# Patient Record
Sex: MALE | Race: BLACK OR AFRICAN AMERICAN
[De-identification: names, ages, dates, MRNs, and addresses within clinical notes are randomized per-mention and may not be internally consistent; named-entity substitution may affect disease eponyms.]

---

## 2018-01-21 ENCOUNTER — HOSPITAL ENCOUNTER (EMERGENCY)
Dept: HOSPITAL 17 - NEPD | Age: 23
Discharge: HOME | End: 2018-01-21
Payer: SELF-PAY

## 2018-01-21 DIAGNOSIS — J06.9: Primary | ICD-10-CM

## 2018-01-21 PROCEDURE — 99282 EMERGENCY DEPT VISIT SF MDM: CPT

## 2018-02-04 ENCOUNTER — HOSPITAL ENCOUNTER (EMERGENCY)
Dept: HOSPITAL 17 - NEPD | Age: 23
Discharge: LEFT BEFORE DISCHARGE | End: 2018-02-04
Payer: SELF-PAY

## 2018-02-04 VITALS
OXYGEN SATURATION: 99 % | DIASTOLIC BLOOD PRESSURE: 55 MMHG | RESPIRATION RATE: 18 BRPM | SYSTOLIC BLOOD PRESSURE: 124 MMHG | TEMPERATURE: 97.9 F | HEART RATE: 55 BPM

## 2018-02-04 VITALS — HEIGHT: 73 IN | BODY MASS INDEX: 33.6 KG/M2 | WEIGHT: 253.53 LBS

## 2018-02-04 VITALS — OXYGEN SATURATION: 98 % | RESPIRATION RATE: 18 BRPM

## 2018-02-04 DIAGNOSIS — K52.9: Primary | ICD-10-CM

## 2018-02-04 LAB
ALBUMIN SERPL-MCNC: 4 GM/DL (ref 3.4–5)
ALP SERPL-CCNC: 109 U/L (ref 45–117)
ALT SERPL-CCNC: 60 U/L (ref 12–78)
AST SERPL-CCNC: 37 U/L (ref 15–37)
BASOPHILS # BLD AUTO: 0.1 TH/MM3 (ref 0–0.2)
BASOPHILS NFR BLD: 0.7 % (ref 0–2)
BILIRUB SERPL-MCNC: 0.3 MG/DL (ref 0.2–1)
BUN SERPL-MCNC: 14 MG/DL (ref 7–18)
CALCIUM SERPL-MCNC: 9.2 MG/DL (ref 8.5–10.1)
CHLORIDE SERPL-SCNC: 106 MEQ/L (ref 98–107)
CREAT SERPL-MCNC: 1.22 MG/DL (ref 0.6–1.3)
EOSINOPHIL # BLD: 0.1 TH/MM3 (ref 0–0.4)
EOSINOPHIL NFR BLD: 0.9 % (ref 0–4)
ERYTHROCYTE [DISTWIDTH] IN BLOOD BY AUTOMATED COUNT: 14.5 % (ref 11.6–17.2)
GFR SERPLBLD BASED ON 1.73 SQ M-ARVRAT: 91 ML/MIN (ref 89–?)
GLUCOSE SERPL-MCNC: 93 MG/DL (ref 74–106)
HCO3 BLD-SCNC: 30.2 MEQ/L (ref 21–32)
HCT VFR BLD CALC: 43.9 % (ref 39–51)
HGB BLD-MCNC: 14.7 GM/DL (ref 13–17)
LYMPHOCYTES # BLD AUTO: 0.2 TH/MM3 (ref 1–4.8)
LYMPHOCYTES NFR BLD AUTO: 3.1 % (ref 9–44)
MCH RBC QN AUTO: 26 PG (ref 27–34)
MCHC RBC AUTO-ENTMCNC: 33.6 % (ref 32–36)
MCV RBC AUTO: 77.3 FL (ref 80–100)
MONOCYTE #: 0.4 TH/MM3 (ref 0–0.9)
MONOCYTES NFR BLD: 5.5 % (ref 0–8)
NEUTROPHILS # BLD AUTO: 6.5 TH/MM3 (ref 1.8–7.7)
NEUTROPHILS NFR BLD AUTO: 89.8 % (ref 16–70)
PLATELET # BLD: 147 TH/MM3 (ref 150–450)
PMV BLD AUTO: 9.2 FL (ref 7–11)
PROT SERPL-MCNC: 7.6 GM/DL (ref 6.4–8.2)
RBC # BLD AUTO: 5.68 MIL/MM3 (ref 4.5–5.9)
SODIUM SERPL-SCNC: 139 MEQ/L (ref 136–145)
WBC # BLD AUTO: 7.3 TH/MM3 (ref 4–11)

## 2018-02-04 PROCEDURE — 85025 COMPLETE CBC W/AUTO DIFF WBC: CPT

## 2018-02-04 PROCEDURE — 96375 TX/PRO/DX INJ NEW DRUG ADDON: CPT

## 2018-02-04 PROCEDURE — 96374 THER/PROPH/DIAG INJ IV PUSH: CPT

## 2018-02-04 PROCEDURE — 96372 THER/PROPH/DIAG INJ SC/IM: CPT

## 2018-02-04 PROCEDURE — 99284 EMERGENCY DEPT VISIT MOD MDM: CPT

## 2018-02-04 PROCEDURE — 80053 COMPREHEN METABOLIC PANEL: CPT

## 2018-02-04 PROCEDURE — 87804 INFLUENZA ASSAY W/OPTIC: CPT

## 2018-02-04 NOTE — PD
HPI


Chief Complaint:  GI Complaint


Time Seen by Provider:  10:48


Travel History


International Travel<30 days:  No


Contact w/Intl Traveler<30days:  No


Traveled to known affect area:  No





History of Present Illness


HPI


22-year-old -American male presents emergency department with history of 

sudden onset nausea and vomiting around midnight last night.  Patient denies 

significant abdominal pain, or diarrhea.  He has had the chills, and low-grade 

fevers.  Patient denies upper respiratory symptoms such as headache, sore throat

, or congestion.  He has no chest pain or congestion there.  Last time he threw 

up was approximately 30 minutes prior to arriving here.  He is still urinating 

and has no urinary symptoms.  He has no known drug allergies





PFSH


Past Medical History


Autoimmune Disease:  No


Gastrointestinal Disorders:  No


Genitourinary:  No


Neurologic:  No


Respiratory:  No


Immunizations Current:  Yes





Past Surgical History


Other Surgery:  No





Social History


Alcohol Use:  No


Tobacco Use:  No


Substance Use:  No





Allergies-Medications


(Allergen,Severity, Reaction):  


Coded Allergies:  


     No Known Allergies (Verified  Adverse Reaction, Unknown, 1/21/18)


Reported Meds & Prescriptions





Reported Meds & Active Scripts


Active


Bentyl (Dicyclomine HCl) 10 Mg Cap 10 Mg PO QID


Zofran (Ondansetron HCl) 4 Mg Tab 4 Mg PO Q6HR PRN








Review of Systems


Except as stated in HPI:  all other systems reviewed are Neg


General / Constitutional:  Positive: Fever, Chills


Eyes:  No: Visual changes


HENT:  No: Headaches


Cardiovascular:  No: Chest Pain or Discomfort


Respiratory:  No: Shortness of Breath


Gastrointestinal:  Positive: Nausea, Vomiting, Loss of Appetite, No: Diarrhea, 

Abdominal Pain, Indigestion, Dysphagia


Genitourinary:  No: Dysuria


Musculoskeletal:  No: Pain


Skin:  No Rash


Neurologic:  No: Weakness


Psychiatric:  No: Depression


Endocrine:  No: Polydipsia


Hematologic/Lymphatic:  No: Easy Bruising





Physical Exam


Narrative


GENERAL: Patient appears mildly ill but not septic.


SKIN: Warm and dry.  Normal color.  Normal turgor


HEAD: Atraumatic. Normocephalic. 


EYES: Pupils equal and round. No scleral icterus. No injection or drainage. 


ENT: No nasal bleeding or discharge.  Mucous membranes pink and moist.  TMs are 

clear bilaterally.  No sinus tenderness.  No postnasal drip.  Pharynx is clear.

  Airways patent.


NECK: Trachea midline.  Supple and nontender


CARDIOVASCULAR: Regular rate and rhythm.  


RESPIRATORY: No accessory muscle use. Clear to auscultation. Breath sounds 

equal bilaterally. 


GASTROINTESTINAL: Abdomen soft, mildly diffusely tender, nondistended.  No 

point tenderness or rebound.  Hepatic and splenic margins not palpable.  No CVA 

tenderness.


MUSCULOSKELETAL: Extremities without clubbing, cyanosis, or edema. No obvious 

deformities. 


NEUROLOGICAL: Awake and alert. No obvious cranial nerve deficits.  Motor 

grossly within normal limits. Five out of 5 muscle strength in the arms and 

legs.  Normal speech.


PSYCHIATRIC: Appropriate mood and affect; insight and judgment normal.





Data


Data


Last Documented VS





Vital Signs








  Date Time  Temp Pulse Resp B/P (MAP) Pulse Ox O2 Delivery O2 Flow Rate FiO2


 


2/4/18 11:32   18     


 


2/4/18 11:31     98 Room Air  


 


2/4/18 10:33 97.9 55      








Orders





 Orders


Complete Blood Count With Diff (2/4/18 10:59)


Comprehensive Metabolic Panel (2/4/18 10:59)


Iv Access Insert/Monitor (2/4/18 10:59)


Ecg Monitoring (2/4/18 10:59)


Oximetry (2/4/18 10:59)


NPO (2/4/18 10:59)


Ondansetron Inj (Zofran Inj) (2/4/18 11:00)


Sodium Chlor 0.9% 1000 Ml Inj (Ns 1000 M (2/4/18 10:59)


Sodium Chloride 0.9% Flush (Ns Flush) (2/4/18 11:00)


Influenzae A/B Antigen (2/4/18 11:45)


Sodium Chloride 0.9% Flush (Ns Flush) (2/4/18 12:15)


Famotidine Inj (Pepcid Inj) (2/4/18 12:15)


Dicyclomine Inj (Bentyl Inj) (2/4/18 12:15)


Ketorolac Inj (Toradol Inj) (2/4/18 12:15)





Labs





Laboratory Tests








Test


  2/4/18


11:10


 


White Blood Count 7.3 TH/MM3 


 


Red Blood Count 5.68 MIL/MM3 


 


Hemoglobin 14.7 GM/DL 


 


Hematocrit 43.9 % 


 


Mean Corpuscular Volume 77.3 FL 


 


Mean Corpuscular Hemoglobin 26.0 PG 


 


Mean Corpuscular Hemoglobin


Concent 33.6 % 


 


 


Red Cell Distribution Width 14.5 % 


 


Platelet Count 147 TH/MM3 


 


Mean Platelet Volume 9.2 FL 


 


Neutrophils (%) (Auto) 89.8 % 


 


Lymphocytes (%) (Auto) 3.1 % 


 


Monocytes (%) (Auto) 5.5 % 


 


Eosinophils (%) (Auto) 0.9 % 


 


Basophils (%) (Auto) 0.7 % 


 


Neutrophils # (Auto) 6.5 TH/MM3 


 


Lymphocytes # (Auto) 0.2 TH/MM3 


 


Monocytes # (Auto) 0.4 TH/MM3 


 


Eosinophils # (Auto) 0.1 TH/MM3 


 


Basophils # (Auto) 0.1 TH/MM3 


 


CBC Comment DIFF FINAL 


 


Differential Comment  


 


Blood Urea Nitrogen 14 MG/DL 


 


Creatinine 1.22 MG/DL 


 


Random Glucose 93 MG/DL 


 


Total Protein 7.6 GM/DL 


 


Albumin 4.0 GM/DL 


 


Calcium Level 9.2 MG/DL 


 


Alkaline Phosphatase 109 U/L 


 


Aspartate Amino Transf


(AST/SGOT) 37 U/L 


 


 


Alanine Aminotransferase


(ALT/SGPT) 60 U/L 


 


 


Total Bilirubin 0.3 MG/DL 


 


Sodium Level 139 MEQ/L 


 


Potassium Level 4.4 MEQ/L 


 


Chloride Level 106 MEQ/L 


 


Carbon Dioxide Level 30.2 MEQ/L 


 


Anion Gap 3 MEQ/L 


 


Estimat Glomerular Filtration


Rate 91 ML/MIN 


 











[facility]


Medical Decision Making


Medical Screen Exam Complete:  Yes


Emergency Medical Condition:  Yes


Differential Diagnosis


Gastroenteritis.  Influenza.  Food poisoning.  Nausea and vomiting


Narrative Course


Patient is medically stable at time of exam.


Labs ordered including CBC, CMP, and rapid influenza.


IV access is obtained the patient was given 1000 mL normal saline bolus, 4 mg 

Zofran IV.


CBC is unremarkable.  CMP is unremarkable.


Rapid influenza is negative.


Patient is given 20 mg Pepcid IV as well as 20 mg of IM Bentyl.


Patient eloped prior to discharge.





Diagnosis





 Primary Impression:  


 Gastroenteritis


Patient Instructions:  Abdominal Pain (ED), Acute Nausea and Vomiting (ED), 

General Instructions


***Med/Other Pt SpecificInfo:  Prescription(s) given


Scripts


Dicyclomine (Bentyl) 10 Mg Cap


10 MG PO QID for Bowel Management, #20 CAP 0 Refills


   Prov: Rosa Purdy MD         2/4/18 


Ondansetron (Zofran) 4 Mg Tab


4 MG PO Q6HR Y for NAUSEA OR VOMITING, #20 TAB 0 Refills


   Prov: Rosa Purdy MD         2/4/18


Disposition:  07 AGAINST MEDICAL ADVICE


Condition:  Stable











Harrison Jones Feb 4, 2018 11:48